# Patient Record
Sex: MALE | Race: WHITE | Employment: UNEMPLOYED | URBAN - METROPOLITAN AREA
[De-identification: names, ages, dates, MRNs, and addresses within clinical notes are randomized per-mention and may not be internally consistent; named-entity substitution may affect disease eponyms.]

---

## 2021-12-25 ENCOUNTER — HOSPITAL ENCOUNTER (EMERGENCY)
Facility: CLINIC | Age: 41
Discharge: HOME | End: 2021-12-25
Attending: EMERGENCY MEDICINE | Admitting: EMERGENCY MEDICINE
Payer: MEDICAID

## 2021-12-25 VITALS
DIASTOLIC BLOOD PRESSURE: 67 MMHG | RESPIRATION RATE: 18 BRPM | SYSTOLIC BLOOD PRESSURE: 115 MMHG | HEART RATE: 68 BPM | OXYGEN SATURATION: 99 % | TEMPERATURE: 98 F

## 2021-12-25 DIAGNOSIS — M62.838 MUSCLE SPASM: ICD-10-CM

## 2021-12-25 DIAGNOSIS — M54.9 BACK PAIN WITHOUT RADICULOPATHY: ICD-10-CM

## 2021-12-25 LAB — GLUCOSE BLDC GLUCOMTR-MCNC: 92 MG/DL (ref 70–99)

## 2021-12-25 PROCEDURE — 99283 EMERGENCY DEPT VISIT LOW MDM: CPT

## 2021-12-25 PROCEDURE — 250N000013 HC RX MED GY IP 250 OP 250 PS 637: Performed by: EMERGENCY MEDICINE

## 2021-12-25 RX ORDER — CYCLOBENZAPRINE HCL 10 MG
10 TABLET ORAL 3 TIMES DAILY PRN
Qty: 20 TABLET | Refills: 0 | Status: SHIPPED | OUTPATIENT
Start: 2021-12-25

## 2021-12-25 RX ORDER — IBUPROFEN 600 MG/1
600 TABLET, FILM COATED ORAL ONCE
Status: COMPLETED | OUTPATIENT
Start: 2021-12-25 | End: 2021-12-25

## 2021-12-25 RX ORDER — CYCLOBENZAPRINE HCL 10 MG
10 TABLET ORAL ONCE
Status: COMPLETED | OUTPATIENT
Start: 2021-12-25 | End: 2021-12-25

## 2021-12-25 RX ADMIN — CYCLOBENZAPRINE 10 MG: 10 TABLET, FILM COATED ORAL at 10:40

## 2021-12-25 RX ADMIN — IBUPROFEN 600 MG: 600 TABLET ORAL at 10:40

## 2021-12-25 ASSESSMENT — ENCOUNTER SYMPTOMS
BACK PAIN: 1
NUMBNESS: 1

## 2021-12-25 NOTE — ED PROVIDER NOTES
History   Chief Complaint:  Back Pain    The history is provided by the patient.      Daniel Suarez is a 41 year old male who presents with his mother for back pain. A few days ago, the patient slipped down a dip in the backyard and hit his back. He was able to get up after the fall, but was sore. For three days now, he notes to have back pain. It worsens when he twists and turns, as it spasms and feels sharp. However, he notes that sitting does help the pain. In addition to noting that it does not hurt when he takes deep breaths. He has not taken any medication for the pain. Furthermore, has not had any long travels. The patient denies any history of blood clots. He is COVID vaccinated with the Dorian and Dorian shot, and noted a doctor told him his nerve pain must be from the shot.    Of note, he has been experiencing numbness and tingling in his fingers and toes for three weeks now.   Numbness and tingling. For breakfast, he had swedish candy.    Review of Systems   Musculoskeletal: Positive for back pain.   Neurological: Positive for numbness.   All other systems reviewed and are negative.      Allergies:  Penicillins    Medications:  The patient is not taking any medications.    Past Medical History:     Mononucleosis    Past Surgical History:    The patient denies any past surgical history.    Family History:    The patient denies any family history.    Social History:  The patient presented to the emergency room with his mother.  The patient has a girlfriend.    Physical Exam     Patient Vitals for the past 24 hrs:   BP Temp Pulse Resp SpO2   12/25/21 1103 115/67 -- 68 18 --   12/25/21 1100 -- 98  F (36.7  C) -- -- --   12/25/21 1030 112/67 -- 77 -- 99 %   12/25/21 1029 -- -- -- 16 99 %     Physical Exam  Eye:  Pupils are equal, round, and reactive.  Extraocular movements intact.    ENT:  No rhinorrhea.  Moist mucus membranes.  Normal tongue and tonsil.    Cardiac:  Regular rate and rhythm.  No murmurs,  gallops, or rubs.    Pulmonary:  Clear to auscultation bilaterally.  No wheezes, rales, or rhonchi.    Abdomen:  Positive bowel sounds.  Abdomen is soft and non-distended, without focal tenderness.    Musculoskeletal:  No midline tenderness to the spine.  There is focal tenderness along the right upper lumbar soft tissue muscultry.     Skin:  Warm and dry without rashes.  Feet are equally warm with strong DP pulses.    Neuro:  Normal sensation throughout the legs and perineum.  5/5 strength through the hips/knees/ankles.  2+ patellar reflexes.  Normal gait.    Psych:  Normal affect with appropriate interaction with examiner.    Emergency Department Course     Laboratory:  Labs Ordered and Resulted from Time of ED Arrival to Time of ED Departure   GLUCOSE BY METER - Normal       Result Value    GLUCOSE BY METER POCT 92     GLUCOSE MONITOR NURSING POCT     Emergency Department Course:  Reviewed:  I reviewed nursing notes, vitals and past medical history    Assessments:  1021 I obtained history and examined the patient as noted above.   1058 I rechecked the patient and explained findings.     Interventions:  1040 ibuprofen (ADVIL/MOTRIN) tablet 600 mg, PO  1040 cyclobenzaprine (FLEXERIL) tablet 10 mg, PO    Disposition:  The patient was discharged to home.     Impression & Plan     Medical Decision Making:  This healthy 41-year-old man presents to us with complaints of having lumbar back pain. This started after he was lifting objects and throwing them into the dump. He took a few days off and felt that it improved. However, he then suffered a simple slip and fall in his mother's yard, aggravating the area. He notes that it continues to cause issues, especially when he twists or moves in a certain way. He denies having any associated radiculopathy. He has no midline tenderness to the spine. He shows no neurologic deficits. His pain is completely reproducible with palpation. He is otherwise PERC negative and I do not  feel that he requires imaging of his lungs or of his spine considering the lack of midline tenderness. With this, he will be treated with anti-inflammatories muscle relaxers. He is comfortable with this plan and we discussed the importance of decreasing activity for a few days to allow it to heal. We also discussed the importance of imaging and reassessment if he develops any radiculopathy, difficulty with urination, increasing the severity of his pain, or any other emergent concerns.    The patient had a secondary concern that at times he feels numbness and tingling into his fingertips and toes. He has not seen a doctor in many years and therefore I sent a glucometer check which shows normal sugars in a nonfasting individual. Considering his normal sugars here, I feel this is unlikely to represent uncontrolled diabetes. He is not showing any signs of hypertension. The symptoms are bilateral and in all 4 extremities and I do not believe this would represent an intracranial process. I reassured him about this we discussed following up with a primary care team if this continues to be an issue.    Diagnosis:    ICD-10-CM    1. Back pain without radiculopathy  M54.9    2. Muscle spasm  M62.838        Discharge Medications:  Discharge Medication List as of 12/25/2021 11:00 AM      START taking these medications    Details   cyclobenzaprine (FLEXERIL) 10 MG tablet Take 1 tablet (10 mg) by mouth 3 times daily as needed for muscle spasms, Disp-20 tablet, R-0, Local Print             Scribe Disclosure:  Surendra LEDEZMA, am serving as a scribe at 10:15 AM on 12/25/2021 to document services personally performed by Trierweiler, Chad A, MD based on my observations and the provider's statements to me.      Trierweiler, Chad A, MD  12/26/21 9010

## 2022-03-13 ENCOUNTER — APPOINTMENT (OUTPATIENT)
Dept: GENERAL RADIOLOGY | Facility: CLINIC | Age: 42
End: 2022-03-13
Attending: NURSE PRACTITIONER
Payer: COMMERCIAL

## 2022-03-13 ENCOUNTER — HOSPITAL ENCOUNTER (EMERGENCY)
Facility: CLINIC | Age: 42
Discharge: HOME | End: 2022-03-13
Attending: EMERGENCY MEDICINE | Admitting: NURSE PRACTITIONER
Payer: COMMERCIAL

## 2022-03-13 VITALS
HEART RATE: 140 BPM | SYSTOLIC BLOOD PRESSURE: 120 MMHG | WEIGHT: 150 LBS | DIASTOLIC BLOOD PRESSURE: 83 MMHG | RESPIRATION RATE: 16 BRPM | TEMPERATURE: 97.9 F | OXYGEN SATURATION: 99 %

## 2022-03-13 PROCEDURE — 73130 X-RAY EXAM OF HAND: CPT | Mod: 26 | Performed by: RADIOLOGY

## 2022-03-13 PROCEDURE — 99282 EMERGENCY DEPT VISIT SF MDM: CPT | Performed by: NURSE PRACTITIONER

## 2022-03-13 PROCEDURE — 73130 X-RAY EXAM OF HAND: CPT | Mod: RT

## 2022-03-13 PROCEDURE — 99283 EMERGENCY DEPT VISIT LOW MDM: CPT | Performed by: NURSE PRACTITIONER

## 2022-03-13 NOTE — ED TRIAGE NOTES
Patient arrives with multiple complaints, main reason why patient is here is right hand pain after missing trying to punch a bag of onions, missed and punched a cabinet. Patient also complains of lower back injury, as well as foot pain which he states was diagnosed as neuropathy.

## 2022-03-14 NOTE — ED NOTES
Patient left prior to being seen by me.  X-rays show fracture of the right fifth metacarpal.  I attempted to call patient x2 with no answer.  I attempted to call patient's contacts x2 with no answer.     Madi Johnson,   03/14/22 0013

## 2022-03-14 NOTE — ED NOTES
Patient had left, told no one. Registration did not see him in the waiting. Nurse called his name x3 and spoke with registration. He had left without telling any medical personnel or signing any documentation.
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
## 2022-03-15 ENCOUNTER — HOSPITAL ENCOUNTER (EMERGENCY)
Facility: CLINIC | Age: 42
Discharge: LEFT BEFORE DISCHARGE | End: 2022-03-15
Payer: COMMERCIAL

## 2022-03-15 VITALS
RESPIRATION RATE: 16 BRPM | DIASTOLIC BLOOD PRESSURE: 76 MMHG | HEART RATE: 85 BPM | WEIGHT: 149.91 LBS | TEMPERATURE: 96 F | SYSTOLIC BLOOD PRESSURE: 109 MMHG

## 2022-03-15 NOTE — ED TRIAGE NOTES
"Patient left without being seen because writer was requesting that patient wear a mask.  Patient then became agitated when writer was going to take him to [facility] patient jumped up and and slammed into the door and writer assisted with opening the door as patient was saying \"let me the fuck out of here\"  "

## 2022-03-15 NOTE — ED TRIAGE NOTES
"Patient here today with concerns of SOB and back pain.  Patient injured his back again from a previous injury.  Patient states he \"broke his hand\"  Patient will not keep a mask on because he states that he cant breath because he \"scorched his lung\"  reports it was \"Dab\" (marijuana).  Writer noted that he reported the last time he was here he probably broke his hand from hitting a cupboard.   "

## 2024-04-18 ENCOUNTER — TRANSFERRED RECORDS (OUTPATIENT)
Dept: HEALTH INFORMATION MANAGEMENT | Facility: CLINIC | Age: 44
End: 2024-04-18
Payer: COMMERCIAL

## 2024-05-03 ENCOUNTER — TRANSCRIBE ORDERS (OUTPATIENT)
Dept: OTHER | Age: 44
End: 2024-05-03

## 2024-05-03 DIAGNOSIS — H52.4 PRESBYOPIA: ICD-10-CM

## 2024-05-03 DIAGNOSIS — H53.2 DIPLOPIA: Primary | ICD-10-CM

## 2024-05-03 DIAGNOSIS — H52.03 HYPEROPIA, BILATERAL: ICD-10-CM

## 2024-06-13 ENCOUNTER — OFFICE VISIT (OUTPATIENT)
Dept: OPHTHALMOLOGY | Facility: CLINIC | Age: 44
End: 2024-06-13
Attending: OPHTHALMOLOGY
Payer: COMMERCIAL

## 2024-06-13 DIAGNOSIS — H53.2 DIPLOPIA: ICD-10-CM

## 2024-06-13 DIAGNOSIS — H52.03 HYPEROPIA, BILATERAL: ICD-10-CM

## 2024-06-13 DIAGNOSIS — H50.22 HYPERTROPIA OF LEFT EYE: ICD-10-CM

## 2024-06-13 DIAGNOSIS — H52.4 PRESBYOPIA: ICD-10-CM

## 2024-06-13 DIAGNOSIS — H53.2 DOUBLE VISION: Primary | ICD-10-CM

## 2024-06-13 DIAGNOSIS — H53.10 SUBJECTIVE VISUAL DISTURBANCE: ICD-10-CM

## 2024-06-13 PROCEDURE — G0463 HOSPITAL OUTPT CLINIC VISIT: HCPCS | Performed by: OPHTHALMOLOGY

## 2024-06-13 PROCEDURE — 92060 SENSORIMOTOR EXAMINATION: CPT

## 2024-06-13 PROCEDURE — 92060 SENSORIMOTOR EXAMINATION: CPT | Performed by: OPHTHALMOLOGY

## 2024-06-13 PROCEDURE — 92060 SENSORIMOTOR EXAMINATION: CPT | Mod: 26 | Performed by: OPHTHALMOLOGY

## 2024-06-13 PROCEDURE — 99204 OFFICE O/P NEW MOD 45 MIN: CPT | Performed by: OPHTHALMOLOGY

## 2024-06-13 RX ORDER — GABAPENTIN 400 MG/1
400 CAPSULE ORAL
COMMUNITY
Start: 2024-02-23

## 2024-06-13 RX ORDER — OLANZAPINE 10 MG/1
10 TABLET ORAL AT BEDTIME
COMMUNITY
Start: 2024-02-23

## 2024-06-13 ASSESSMENT — CONF VISUAL FIELD
OS_INFERIOR_TEMPORAL_RESTRICTION: 0
METHOD: COUNTING FINGERS
OS_SUPERIOR_TEMPORAL_RESTRICTION: 0
OS_SUPERIOR_NASAL_RESTRICTION: 0
OD_INFERIOR_NASAL_RESTRICTION: 0
OD_NORMAL: 1
OS_INFERIOR_NASAL_RESTRICTION: 0
OD_INFERIOR_TEMPORAL_RESTRICTION: 0
OD_SUPERIOR_TEMPORAL_RESTRICTION: 0
OD_SUPERIOR_NASAL_RESTRICTION: 0
OS_NORMAL: 1

## 2024-06-13 ASSESSMENT — TONOMETRY
IOP_METHOD: ICARE SINGLE JC
OS_IOP_MMHG: 13
OD_IOP_MMHG: 13

## 2024-06-13 ASSESSMENT — PATIENT HEALTH QUESTIONNAIRE - PHQ9: SUM OF ALL RESPONSES TO PHQ QUESTIONS 1-9: 27

## 2024-06-13 ASSESSMENT — VISUAL ACUITY
OD_SC+: -1
OS_SC: 20/20
OD_SC: 20/20
METHOD: SNELLEN - LINEAR
OS_SC+: -2

## 2024-06-13 ASSESSMENT — CUP TO DISC RATIO
OD_RATIO: 0.2
OS_RATIO: 0.2

## 2024-06-13 ASSESSMENT — SLIT LAMP EXAM - LIDS
COMMENTS: NORMAL
COMMENTS: NORMAL
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
## 2024-06-21 ENCOUNTER — ANCILLARY PROCEDURE (OUTPATIENT)
Dept: CT IMAGING | Facility: CLINIC | Age: 44
End: 2024-06-21
Attending: OPHTHALMOLOGY
Payer: COMMERCIAL

## 2024-06-21 DIAGNOSIS — H53.2 DOUBLE VISION: ICD-10-CM

## 2024-06-21 PROCEDURE — 70486 CT MAXILLOFACIAL W/O DYE: CPT | Mod: GC | Performed by: RADIOLOGY
